# Patient Record
Sex: MALE | Race: WHITE
[De-identification: names, ages, dates, MRNs, and addresses within clinical notes are randomized per-mention and may not be internally consistent; named-entity substitution may affect disease eponyms.]

---

## 2022-01-14 ENCOUNTER — HOSPITAL ENCOUNTER (EMERGENCY)
Dept: HOSPITAL 75 - ER | Age: 42
Discharge: HOME | End: 2022-01-14
Payer: SELF-PAY

## 2022-01-14 VITALS — HEIGHT: 67.01 IN | BODY MASS INDEX: 37.68 KG/M2 | WEIGHT: 240.08 LBS

## 2022-01-14 VITALS — DIASTOLIC BLOOD PRESSURE: 75 MMHG | SYSTOLIC BLOOD PRESSURE: 134 MMHG

## 2022-01-14 DIAGNOSIS — S61.441A: Primary | ICD-10-CM
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
## 2022-01-14 PROCEDURE — 73130 X-RAY EXAM OF HAND: CPT

## 2022-01-14 PROCEDURE — 96372 THER/PROPH/DIAG INJ SC/IM: CPT

## 2022-01-14 PROCEDURE — 90471 IMMUNIZATION ADMIN: CPT

## 2022-01-14 NOTE — DIAGNOSTIC IMAGING REPORT
EXAMINATION: Right hand radiograph



EXAM DATE: 01/14/2022



COMPARISON: None available.



HISTORY: nail in hand



TECHNIQUE: Three views of the right hand.



FINDINGS: 



A metallic foreign body, likely nail, is seen within the ulnar

aspect of the right hand. Exact location of the nail is difficult

to identify on these radiographs. No acute fracture is seen. The

joint spaces are normal. The soft tissues are normal. No other

radiopaque foreign body.



IMPRESSION:

1. A metallic nail is seen through the ulnar aspect of the right

hand, likely involving the fourth and fifth metacarpophalangeal

joint space areas. No acute fracture is seen to confirm osseous

involvement. Recommend correlation with physical exam.



Dictated by: 



  Dictated on workstation # [id number]

## 2022-01-14 NOTE — DIAGNOSTIC IMAGING REPORT
HISTORY: Nail removal from the right hand.



TECHNIQUE: Three views of the right hand.



COMPARISON: Radiographs from the same day.



FINDINGS:



The metal nail has been removed from the right hand. No residual

metal foreign body is seen. There is no screw tract through the

bone, and no bony defect to indicate bony involvement. The

alignment appears normal, and joint spaces are preserved.



IMPRESSION:

1. Removal of the metal nail from the right hand with no residual

foreign body seen. No bony defect is identified.



Dictated by: 



  Dictated on workstation # [id number]

## 2022-01-14 NOTE — ED UPPER EXTREMITY
General


Chief Complaint:  Foreign Body


Stated Complaint:  R HAND NAIL IMBEDDED


Source:  patient


Exam Limitations:  no limitations


 (JULIEN DAVILA)





History of Present Illness


Date Seen by Provider:  Jan 14, 2022


Time Seen by Provider:  13:50


Initial Comments


To ER by employer with reports of a nail into the right hand that enters through

the palmar aspect over the distal fifth metacarpal and exits dorsally over the 

more ulnar side.  Tetanus is up-to-date.  This was from a nail gun.


Onset:  just prior to arrival


Severity:  moderate


Pain/Injury Location:  right hand


Method of Injury:  unknown


Modifying Factors:  Improves With Movement (JULIEN DAVILA)





Allergies and Home Medications


Allergies


Coded Allergies:  


     No Known Drug Allergies (Unverified , 1/14/22)





Patient Home Medication List


Home Medication List Reviewed:  Yes


 (JULIEN DAVILA)


Cephalexin (Cephalexin) 500 Mg Tablet, 500 MG PO QID


   Prescribed by: JULIEN DAVILA on 1/14/22 1611


   Last Action: New Order


Hydrocodone/Acetaminophen (Hydrocodone-Acetamin 5-325 mg) 1 Each Tablet, 1 TAB 

PO Q4H PRN for PAIN-MODERATE (5-7)


   Prescribed by: JULIEN DAVILA on 1/14/22 1611


   Last Action: New Order





Review of Systems


Constitutional:  see HPI


EENTM:  see HPI


Respiratory:  no symptoms reported


Cardiovascular:  no symptoms reported


Genitourinary:  no symptoms reported


Musculoskeletal:  see HPI


Skin:  no symptoms reported


Psychiatric/Neurological:  No Symptoms Reported (JULIEN DAVILA)





Physical Exam


Vital Signs





Vital Signs - First Documented








 1/14/22 1/14/22





 13:40 14:56


 


Temp 35.1 


 


Pulse 79 


 


Resp 16 


 


B/P (MAP) 175/112 (133) 


 


Pulse Ox  99


 


O2 Delivery Room Air 








 (SARAH RODRIGUEZ MD)


Vital Signs


Capillary Refill :  


 (JULIEN DAVILA)


Height, Weight, BMI


Height: '"


Weight: lbs. oz. kg;  BMI


Method:


General Appearance:  WD/WN, no apparent distress


HEENT:  PERRL/EOMI, normal ENT inspection


Respiratory:  no respiratory distress, no accessory muscle use


Shoulder:  normal inspection, non-tender


Elbow/Forearm:  normal inspection, non-tender


Wrist:  Yes normal inspection, Yes non-tender


Hand:  Right, limited ROM (There is a nail that enters to the palmar aspect 

between the distal third and fourth metacarpals and exits dorsally over the base

of the proximal phalanx of the pinky finger.)


Neurologic/Psychiatric:  alert, normal mood/affect, oriented x 3


Skin:  normal color, warm/dry (JULIEN DAVILA)





Departure


Communication (Admissions)


1422-Tetanus is up-to-date, I anesthetized parallel to the nail with lidocaine 

without epinephrine.  Nail was grasped with a pair of pliers and twisted to 

remove it.  I suspect this penetrated through the proximal phalanx of the pinky 

finger.  We will get a repeat x-ray to confirm.  I then irrigated the tract 

using a 10 cc syringe and an 18-gauge IV catheter tip through the nail tract wi

th sterile saline.  Covered with gauze left open and we will give him antibiotic

prophylaxis.


 (JULIEN DAVILA)





Impression





   Primary Impression:  


   Foreign body in hand


Disposition:  01 HOME, SELF-CARE


Condition:  Stable





Departure-Patient Inst.


Decision time for Depature:  14:23


 (JULIEN DAVILA)


Referrals:  


NO,LOCAL PHYSICIAN (PCP)


Primary Care Physician








OGDEN,JUSTIN S MD SCHWAB,ILIANA WINTERS,YANET LEE MD


Patient Instructions:  Removal of Foreign Body in Skin





Add. Discharge Instructions:  


1.  Leave this covered with a dressing, change daily and as needed you can 

shower letting water run over this and then pat it dry and reapply the bandage. 

Do not soak this for about 2 weeks.  Do not use that hand for about 2 weeks.  

Follow-up with an orthopedic surgeon of your choosing.  A list has been provided

for you.  Call Monday for an appointment.  Take the pain medication and the 

antibiotic as directed.  Return to ER for any worsening, redness or swelling or 

fevers.





All discharge instructions reviewed with patient and/or family. Voiced 

understanding.


Scripts


Hydrocodone/Acetaminophen (Hydrocodone-Acetamin 5-325 mg) 1 Each Tablet


1 TAB PO Q4H PRN for PAIN-MODERATE (5-7), #14 TAB


   .


   Prov: JULIEN DAVILA         1/14/22 


Cephalexin (Cephalexin) 500 Mg Tablet


500 MG PO QID, #28 TAB


   .


   Prov: JULIEN DAVILA         1/14/22








ATTENDING PHYSICIAN NOTE:


I was physically present as attending physician in the emergency department 

during the care of this patient, but I was not directly involved in the decision

making or delivery of care for this patient. 


 (SARAH RODRIGUEZ MD)











JULIEN DAVILA             Jan 14, 2022 13:50


SARAH RODRIGUEZ MD        Jan 20, 2022 07:07